# Patient Record
Sex: FEMALE | Race: BLACK OR AFRICAN AMERICAN
[De-identification: names, ages, dates, MRNs, and addresses within clinical notes are randomized per-mention and may not be internally consistent; named-entity substitution may affect disease eponyms.]

---

## 2018-03-13 ENCOUNTER — HOSPITAL ENCOUNTER (OUTPATIENT)
Dept: HOSPITAL 62 - RAD | Age: 43
End: 2018-03-13
Attending: SURGERY
Payer: COMMERCIAL

## 2018-03-13 DIAGNOSIS — C16.1: Primary | ICD-10-CM

## 2018-03-13 PROCEDURE — A9552 F18 FDG: HCPCS

## 2018-03-13 PROCEDURE — 78815 PET IMAGE W/CT SKULL-THIGH: CPT

## 2021-01-13 NOTE — RADIOLOGY REPORT (SQ)
EXAM DESCRIPTION:  PET CT SKULL/THIGH



COMPLETED DATE/TIME:  3/13/2018 6:52 pm



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



COMPARISON:  Report only, CT abdomen pelvis [facility] 1/18/2018.



RADIONUCLIDE AND DOSE:  11.6 mCi F18 FDG

The route of agent administration: Intravenous



FASTING BLOOD SUGAR:  67 mg/dl



CONTRAST TYPE AND DOSE:  No CT contrast given.



TECHNIQUE:  Blood glucose level was verified.  Above dose of FDG was injected intravenously.  2-D seg
mented attenuation correction images were obtained from the base of the skull to the midthighs.  Nonc
ontrast CT images were obtained for attenuation correction and fusion with emission images.  CT image
s were performed without oral or intravenous contrast and are not sensitive for parenchymal lesions. 
 A series of overlapping emission PET images were obtained.  Images reviewed and manipulated at [facility] work station by the radiologist.  Images stored on PACS.



LIMITATIONS:  Slender patient without much body fat



FINDINGS:  HEAD AND NECK: No areas of abnormal metabolic activity in the soft tissues of the head and
 neck.

CHEST: No areas of abnormal metabolic activity in the chest.  There are multiple small parenchymal an
d subpleural or pleural-based lung nodules bilaterally, non metabolic.  The largest of these is 7 x 4
 mm in the left upper lobe.

ABDOMEN AND PELVIS: Patient is post subtotal gastrectomy and gastrojejunostomy.  Just superior to the
 gastrojejunostomy anastomotic staples, a 1 cm focus of increased metabolic activity is present with 
SUV of 4.6.  This is abnormal but nonspecific, and could reflect mucosal inflammation or recurrent tu
mor

PROXIMAL LOWER EXTREMITIES: No areas of abnormal metabolic activity in the soft tissues of the lower 
extremities.

BONES: No abnormal metabolic activity in the visualized skeleton.

ADDITIONAL CT FINDINGS: Clips right upper quadrant post cholecystectomy

OTHER: Liver background activity 1.8 SUV.  Blood pool background activity 1.4 SUV



IMPRESSION:  Multiple tiny lung parenchymal and subpleural/pleural based nodules less than 7 mm in si
ze, non metabolic.  Please note that nodules less than 1 cm in size could yield a false negative resu
lt by PET-CT.

1 cm focus of increased metabolic activity just cephalad to the gastrojejunostomy anastomotic staples
.  This is abnormal but nonspecific, and could reflect mucosal inflammation rather than recurrent lila
or



TECHNICAL DOCUMENTATION:  JOB ID:  [id number]

 [employer]- All Rights Reserved



Reading location - IP/workstation name: [email or web address]
no